# Patient Record
Sex: FEMALE | Race: WHITE | NOT HISPANIC OR LATINO | URBAN - METROPOLITAN AREA
[De-identification: names, ages, dates, MRNs, and addresses within clinical notes are randomized per-mention and may not be internally consistent; named-entity substitution may affect disease eponyms.]

---

## 2025-07-12 ENCOUNTER — OFFICE VISIT (OUTPATIENT)
Dept: URGENT CARE | Age: 50
End: 2025-07-12
Payer: COMMERCIAL

## 2025-07-12 ENCOUNTER — ANCILLARY PROCEDURE (OUTPATIENT)
Dept: URGENT CARE | Age: 50
End: 2025-07-12
Payer: COMMERCIAL

## 2025-07-12 VITALS
HEIGHT: 66 IN | TEMPERATURE: 98.9 F | OXYGEN SATURATION: 98 % | HEART RATE: 65 BPM | SYSTOLIC BLOOD PRESSURE: 131 MMHG | DIASTOLIC BLOOD PRESSURE: 85 MMHG | RESPIRATION RATE: 18 BRPM | WEIGHT: 175 LBS | BODY MASS INDEX: 28.12 KG/M2

## 2025-07-12 DIAGNOSIS — R52 PAIN: ICD-10-CM

## 2025-07-12 DIAGNOSIS — S46.911A RIGHT SHOULDER STRAIN, INITIAL ENCOUNTER: Primary | ICD-10-CM

## 2025-07-12 PROCEDURE — 3008F BODY MASS INDEX DOCD: CPT | Performed by: PHYSICIAN ASSISTANT

## 2025-07-12 PROCEDURE — 99070 SPECIAL SUPPLIES PHYS/QHP: CPT | Performed by: PHYSICIAN ASSISTANT

## 2025-07-12 PROCEDURE — 99204 OFFICE O/P NEW MOD 45 MIN: CPT | Performed by: PHYSICIAN ASSISTANT

## 2025-07-12 PROCEDURE — 73030 X-RAY EXAM OF SHOULDER: CPT | Mod: RIGHT SIDE | Performed by: NURSE PRACTITIONER

## 2025-07-12 RX ORDER — HYDROCHLOROTHIAZIDE 12.5 MG/1
25 TABLET ORAL DAILY
COMMUNITY

## 2025-07-12 RX ORDER — METOPROLOL SUCCINATE 100 MG/1
100 TABLET, EXTENDED RELEASE ORAL DAILY
COMMUNITY

## 2025-07-12 RX ORDER — SIMVASTATIN 40 MG/1
40 TABLET, FILM COATED ORAL NIGHTLY
COMMUNITY

## 2025-07-12 RX ORDER — CYCLOBENZAPRINE HCL 10 MG
10 TABLET ORAL 3 TIMES DAILY PRN
Qty: 20 TABLET | Refills: 0 | Status: SHIPPED | OUTPATIENT
Start: 2025-07-12

## 2025-07-12 RX ORDER — PAROXETINE 20 MG/1
20 TABLET, FILM COATED ORAL EVERY MORNING
COMMUNITY

## 2025-07-12 RX ORDER — SEMAGLUTIDE 1.7 MG/.75ML
1.7 INJECTION, SOLUTION SUBCUTANEOUS
COMMUNITY

## 2025-07-12 ASSESSMENT — PAIN SCALES - GENERAL: PAINLEVEL_OUTOF10: 6

## 2025-07-12 NOTE — LETTER
July 12, 2025     Patient: Florecita Cassidy   YOB: 1975   Date of Visit: 7/12/2025       To Whom It May Concern:    Florecita Cassidy was seen in my clinic on 7/12/2025 at 4:15 pm.     Florecita has the following restrictions at work until 7/20/2025:  No overhead reaching  No lifting, pushing or pulling >10 lbs of force  No climbing ladders or operating heavy machinery    If you have any questions or concerns, please don't hesitate to call.         Sincerely,         Brenden Kim PA-C        CC: No Recipients

## 2025-07-12 NOTE — PROGRESS NOTES
"Subjective   Patient ID: Florecita Cassidy is a 49 y.o. female. They present today with a chief complaint of right shoulder pain    Patient disposition: Home    HISTORY OF PRESENT ILLNESS:    This is an adult female presenting for right shoulder pain. She is RHD. She denies injury. She has had vague R shoulder soreness for a couple of weeks but today has more right shoulder blade pain aggravated by trying to push up off of a seated position. She denies neck pain. Denies radiation of pain. Denies arm weakness, paresthesias. Denies LBP. She has had shoulder pain issues on the left side in the past including PT.    Past Medical History  Allergies as of 07/12/2025    (No Known Allergies)       Prescriptions Prior to Admission[1]     Medical History[2]    Surgical History[3]     reports that she has never smoked. She does not have any smokeless tobacco history on file. She reports that she does not currently use alcohol. She reports that she does not use drugs.    Review of Systems    Negative except as documented in the History of Present Illness.                             Objective    Vitals:    07/12/25 1619   BP: 131/85   BP Location: Left arm   Patient Position: Sitting   BP Cuff Size: Adult   Pulse: 65   Resp: 18   Temp: 37.2 °C (98.9 °F)   TempSrc: Oral   SpO2: 98%   Weight: 79.4 kg (175 lb)   Height: 1.676 m (5' 6\")     No LMP recorded (lmp unknown). Patient has had a hysterectomy.      PHYSICAL EXAMINATION:    CONSTITUTIONAL: nontoxic, ambulatory, well-appearing    EYES:  No scleral icterus or orbital trauma noted. No conjunctival injection. PERRLA. EOMI b/l. No nystagmus.    HEENT:  Head and face are unremarkable and atraumatic. Mucous membranes moist. Nares are patent without copious rhinorrhea.  No lymphadenopathy.    CARDIOVASCULAR:  RRR, no m/r/g. Nl S1/S2.    RUE    *Distal pulses intact, capillary refill 1 second in distal digits.    LUNGS:  CTAB, no r/r/w. No dullness to percussion.    ABDOMEN:  " Nonobese, nonscaphoid..    SKIN: Normal skin exam of RUE.    MUSCULOSKELETAL:     RUE    Negative empty can test. Negative drop arm.    * ROM is FROM w pain on full shoulder flexion    * Tenderness is present R scapular region with spasm    * Soft tissue swelling is absent         NEURO:     RUE    * Motor function is distally intact    * Sensation is distally intact         PSYCH: Appropriate mood and affect.         ---------------------------------------------------------------         MDM:  XR interpreted by me independently negative for any significant degenerative changes or bony injury. The examination and location of pain are low suspicion for rotator cuff pathology. The patient has an appt to see her orthopedic doctor in 5 days. She was given home therapy program printout, Rx Flexeril, instructions about NSAIDs PRN and heat/icing.     The complexity of this visit was moderate because this was a new, previously undiagnosed medical problem and because X-rays were ordered and reviewed by me independently for this visit.          Procedures    Diagnostic study results (if any) were reviewed by Brenden Kim PA-C.    No results found for this visit on 07/12/25.     Assessment/Plan   Allergies, medications, history, and pertinent labs/EKGs/Imaging reviewed by Brenden Kim PA-C.     Orders and Diagnoses  Diagnoses and all orders for this visit:  Pain  -     XR shoulder right 2+ views; Future      Medical Admin Record      Follow Up Instructions  No follow-ups on file.    Electronically signed by Brenden Kim PA-C  4:38 PM         [1] (Not in a hospital admission)  [2]   Past Medical History:  Diagnosis Date    Encounter for screening for other suspected endocrine disorder 09/02/2014    Screening for endocrine, nutritional, metabolic and immunity disorder    Morbid (severe) obesity due to excess calories (Multi) 07/28/2014    Morbid obesity   [3]   Past Surgical History:  Procedure Laterality Date     BUNIONECTOMY  02/19/2014    Simple Bunion Exostectomy ([facility])    OTHER SURGICAL HISTORY  02/19/2014    Cholecystotomy    OTHER SURGICAL HISTORY  06/23/2014    Gastric Surgery For Morbid Obesity Laparoscopic Longitudinal Gastrectomy